# Patient Record
Sex: FEMALE | Race: BLACK OR AFRICAN AMERICAN | NOT HISPANIC OR LATINO | Employment: UNEMPLOYED | URBAN - METROPOLITAN AREA
[De-identification: names, ages, dates, MRNs, and addresses within clinical notes are randomized per-mention and may not be internally consistent; named-entity substitution may affect disease eponyms.]

---

## 2020-01-06 ENCOUNTER — APPOINTMENT (OUTPATIENT)
Dept: RADIOLOGY | Facility: CLINIC | Age: 65
End: 2020-01-06
Payer: COMMERCIAL

## 2020-01-06 ENCOUNTER — OFFICE VISIT (OUTPATIENT)
Dept: OBGYN CLINIC | Facility: CLINIC | Age: 65
End: 2020-01-06
Payer: COMMERCIAL

## 2020-01-06 VITALS
DIASTOLIC BLOOD PRESSURE: 80 MMHG | SYSTOLIC BLOOD PRESSURE: 112 MMHG | WEIGHT: 125.6 LBS | BODY MASS INDEX: 20.93 KG/M2 | HEIGHT: 65 IN

## 2020-01-06 DIAGNOSIS — M79.641 RIGHT HAND PAIN: ICD-10-CM

## 2020-01-06 DIAGNOSIS — M79.641 RIGHT HAND PAIN: Primary | ICD-10-CM

## 2020-01-06 PROCEDURE — 73130 X-RAY EXAM OF HAND: CPT

## 2020-01-06 PROCEDURE — 99203 OFFICE O/P NEW LOW 30 MIN: CPT | Performed by: ORTHOPAEDIC SURGERY

## 2020-01-06 NOTE — PATIENT INSTRUCTIONS
What is it Osteoarthritis of the Hand? Arthritis literally means inflamed joint  Normally a joint consists of two smooth, cartilage-covered bone surfaces that fit together as a matched set and that move smoothly against one other  Arthritis results when these smooth surfaces become irregular and don't fit together well anymore and essentially wear out   Arthritis can affect any joint in the body, but it is most noticeable when it affects the hands and fingers  Each hand has 19 bones, plus 8 small bones and the two forearm bones that form the wrist  Arthritis of the hand can be both painful and disabling  The most common forms of arthritis in the hand are osteoarthritis, post-traumatic arthritis (after an injury), and rheumatoid arthritis  Other causes of arthritis of the hand are infection, gout, and psoriasis  Osteoarthritis of the hand  Osteoarthritis is a degenerative joint disease in which the cushioning cartilage that covers the bone surfaces at the joints begins to wear out  It may be caused by simple wear and tear on joints, or it may develop after an injury to a joint  In the hand, osteoarthritis most often develops in three sites (see Figure 1):   at the base of the thumb, where the thumb and wrist come together (the trapezio-metacarpal, or basilar, joint)   at the end joint closest to the finger tip (the distal interphalangeal or DIP joint)   at the middle joint of a finger (the proximal interphalangeal or PIP joint)  It also often develops in the wrist     Signs and symptoms of arthritis of the hand  Stiffness, swelling, and pain are symptoms common to all forms of arthritis in the hand  With osteoarthritis, bony nodules may develop at the middle, or PIP, joint of the finger (Vianey's nodes), and at the end-joints, or DIP, of the finger (Heberden's nodes) (see Figure 2)  A deep, aching pain at the base of the thumb is typical of osteoarthritis of the basilar joint   Swelling and a bump at the base of the thumb where it joins the wrist may also be observed   and pinch strength may be diminished, causing difficulty with activities such as opening jars or turning keys  Pain, swelling, stiffness, and diminished strength are also seen with osteoarthritis of the wrist     How is osteoarthritis diagnosed? Your doctor will examine you and determine whether you have similar symptoms in other joints and assess the impact of the arthritis on your life and activities  The clinical appearance of the hands and fingers helps to diagnose the type of arthritis  X-rays will also show certain characteristics of osteoarthritis, such as narrowing of the joint space, the formation of bony outgrowths (osteophytes or nodes), and the development of dense, hard areas of bone along the joint margins  Treatment  Treatment is designed to relieve pain and restore function  Anti-inflammatory or other analgesic medication may be of benefit in relieving pain  Brief periods of rest may help if the arthritis has flared up  You may also be advised to wear finger or wrist splints at night and for selected activities  Often soft sleeves may be of some benefit when the rigid splints are too restrictive, especially when the arthritis is affecting the joint at the base of your thumb  Heat modalities in the form of warm wax or paraffin baths might help, and when severe swelling is present, cold modalities may be of help  It is important to maintain motion in the fingers and use the hand as productively as possible  Hand therapy is often helpful with these exercises, splints, and modalities  A cortisone injection can often provide relief of symptoms, but does not cure the arthritis  Surgery is usually not advised unless these more conservative treatments fail  Surgery is indicated when the patient either has too much pain or too little function   In most cases, the patient knows best and actually tells the doctor when it is time for surgery  The goal is to restore as much function as possible and to eliminate the pain or reduce it to a tolerable level  One type of surgery is joint fusion, in which the arthritic surface is removed and the bones on each side of the joint are fused together, eliminating motion from the problem joint  Joint fusion may be used to relieve pain and correct deformities that interfere with functioning  Another approach is joint reconstruction, in which the degenerated joint surface is removed in order to eliminate the rough, irregular bone-to-bone contact that causes pain and restricts motion  Once the degenerated portion of the joint surface is removed, it may be replaced with rolled-up soft tissue, such as a tendon, or with a joint replacement implant  Which type of surgery is used depends on the particular joint(s) involved, your activities, and your own needs  Your hand surgeon can help you decide which type of surgery is the most appropriate for you  © [employer]  www handcare  org

## 2020-01-06 NOTE — PROGRESS NOTES
ASSESSMENT/PLAN:    Assessment:   Arthritis of the right long finger DIP joint      Plan:   Resume activities as tolerated, NSAIDs and bracing using Staxx brace as needed  This was provided in the office  No Tylenol due to history of Hepatitis    Follow Up:  PRN    General Discussions:     Osteoarthritis:  The anatomy and physiology of osteoarthritis was discussed with the patient today in the office  Deterioration of the articular cartilage eventually leads to altered mobility at the joint, resulting in joint subluxation, osteophyte formation, cystic changes, as well as subchondral sclerosis  Eventually, pain, limited mobility, and compensatory hypermobility at surrounding joints may develop  While normal activity and usage of the joint may provide a painful experience to the patient, this typically does not result in damage to the limb  Treatment options include splints to decreased joint edema, pain, and inflammation  Therapy exercises to strengthen the surrounding musculature may relieve pain, but do not alter the overall continued development of osteoarthritis  Oral medications, topical medications, corticosteroid injections may decrease pain and increase overall function  Eventually, some patients may require surgical intervention  _____________________________________________________  CHIEF COMPLAINT:  Chief Complaint   Patient presents with    Right Hand - Pain         SUBJECTIVE:  Rubio Serrano is a 71 y o  female who presents with right long finger DIP pain for the past few years  Patient does not note anything specific that started the pain, but she does note that the pain is worse during the day when she works as a   No history of injury to this finger  She has not tried any bracing or other treatment  She does have a history of Hepatitis being treated at [facility] in [place] for which she is actively being treated      PAST MEDICAL HISTORY:  Past Medical History: Diagnosis Date    Liver disease        PAST SURGICAL HISTORY:  History reviewed  No pertinent surgical history  FAMILY HISTORY:  Family History   Problem Relation Age of Onset    Arthritis Mother     Hypertension Brother        SOCIAL HISTORY:  Social History     Tobacco Use    Smoking status: Never Smoker    Smokeless tobacco: Never Used   Substance Use Topics    Alcohol use: Yes     Comment: soc    Drug use: Never       MEDICATIONS:    Current Outpatient Medications:     AZATHIOPRINE PO, Take by mouth, Disp: , Rfl:     ALLERGIES:  No Known Allergies    REVIEW OF SYSTEMS:  Pertinent items are noted in HPI  A comprehensive review of systems was negative  LABS:  HgA1c: No results found for: HGBA1C  BMP: No results found for: GLUCOSE, CALCIUM, NA, K, CO2, CL, BUN, CREATININE      _____________________________________________________  PHYSICAL EXAMINATION:  Vital signs: /80   Ht 5' 5" (1 651 m)   Wt 57 kg (125 lb 9 6 oz)   BMI 20 90 kg/m²   General: well developed and well nourished, alert, oriented times 3 and appears comfortable  Psychiatric: Normal  HEENT: Trachea Midline, No torticollis  Cardiovascular: No discernable arrhythmia  Pulmonary: No wheezing or stridor  Skin: No masses, erythema, lacerations, fluctation, ulcerations  Neurovascular: Sensation Intact to the Median, Ulnar, Radial Nerve, Motor Intact to the Median, Ulnar, Radial Nerve and Pulses Intact    MUSCULOSKELETAL EXAMINATION:  Right Hand:  Minimal swelling and erythema for right long finger DIP  Minimal TTP over DIP  No pain with ROM of DIP  5-45 degrees of extension-flexion  Finger WWP    _____________________________________________________  STUDIES REVIEWED:  No Studies to review and Images were reviewd in PACS: X-rays right hand show right long finger DIP arthritic changes with osteophytes and joint space narrowing  No acute fractures or dislocations      PROCEDURES PERFORMED:  None    I interviewed, took the history and examined the patient  I discuss the case with the resident and reviewed the resident's note  I supervised the resident and I agree with the resident management plan as it was presented to me  I was present in the clinic and examined the patient